# Patient Record
Sex: FEMALE | Race: WHITE | URBAN - METROPOLITAN AREA
[De-identification: names, ages, dates, MRNs, and addresses within clinical notes are randomized per-mention and may not be internally consistent; named-entity substitution may affect disease eponyms.]

---

## 2017-03-23 ENCOUNTER — OFFICE VISIT (OUTPATIENT)
Dept: URGENT CARE | Facility: RETAIL CLINIC | Age: 62
End: 2017-03-23
Payer: COMMERCIAL

## 2017-03-23 VITALS
SYSTOLIC BLOOD PRESSURE: 128 MMHG | OXYGEN SATURATION: 97 % | HEART RATE: 91 BPM | DIASTOLIC BLOOD PRESSURE: 81 MMHG | TEMPERATURE: 98.8 F

## 2017-03-23 DIAGNOSIS — J02.9 ACUTE PHARYNGITIS, UNSPECIFIED ETIOLOGY: Primary | ICD-10-CM

## 2017-03-23 LAB — S PYO AG THROAT QL IA.RAPID: NORMAL

## 2017-03-23 PROCEDURE — 87081 CULTURE SCREEN ONLY: CPT | Performed by: INTERNAL MEDICINE

## 2017-03-23 PROCEDURE — 87880 STREP A ASSAY W/OPTIC: CPT | Mod: QW | Performed by: INTERNAL MEDICINE

## 2017-03-23 PROCEDURE — 99212 OFFICE O/P EST SF 10 MIN: CPT | Performed by: INTERNAL MEDICINE

## 2017-03-23 NOTE — NURSING NOTE
Chief Complaint   Patient presents with     Fever     low grade; getting over cold, cough     Throat Problem     mild pain, drainage main complaint     Generalized Body Aches     Ent Problem     ears feel full       Initial /81 (BP Location: Left arm)  Pulse 91  Temp 98.8  F (37.1  C) (Oral)  SpO2 97% There is no height or weight on file to calculate BMI.  Medication Reconciliation: complete

## 2017-03-23 NOTE — PROGRESS NOTES
[facility] Care Progress Note        Kameron Wright MD, MPH  03/23/2017        History:      Kennedi Corea is a pleasant 61 year old year old female with a chief complaint of sore throat and nasal congestion x 2 days.   No fever or chills.   No dyspnea or chest pain.   No headache or neck pain.  No GI or  symptoms.   No MSK symptoms.         Assessment and Plan:        - RAPID STREP SCREEN  - BETA STREP GROUP A R/O CULTURE  URI:  Discussed supportive care with the patient:  Advised to increase fluid intake and rest.  Patient was advised to use throat lozenges and gargle with salt water for symptomatic relief.  Tylenol 650 mg po for pain q 6 hours prn  F/u w PCP in 1 week, earlier if symptoms worsen.                   Physical Exam:      /81 (BP Location: Left arm)  Pulse 91  Temp 98.8  F (37.1  C) (Oral)  SpO2 97%     Constitutional: Patient is in no distress The patient is pleasant and cooperative.   HEENT: Head:  Head is atraumatic, normocephalic.    Eyes: Pupils are equal, round and reactive to light and accomodation.  Sclera is non-icteric. No conjunctival injection, or exudate noted. Extraocular motion is intact. Visual acuity is intact bilaterally.  Ears:  External acoustic canals are patent and clear.  There is no erythema and bulging( exudate)  of the ( R/L ) tympanic membrane(s ).   Nose:   Nasal congestion w/o drainage or mucosal ulceration is noted.  Throat:  Oral mucosa is moist.  No oral lesions are noted.  Posterior pharyngeal hyperemia w/o exudate noted.     Neck Supple.  There is no cervical lymphadenopathy.  No nuchal rigidity noted.  There is no meningismus.     Cardiovascular: Heart is regular to rate and rhythm.  No murmur is noted.     Lungs: Clear in the anterior and posterior pulmonary fields.   Abdomen: Soft and non-tender.    Back No flank tenderness is noted.   Extremeties No edema, no calf tenderness.   Neuro: No focal deficit.   Skin No petechiae or purpura  is noted.  There is no rash.   Mood Normal              Data:      All new lab and imaging data was reviewed.   Results for orders placed or performed in visit on 03/23/17   RAPID STREP SCREEN   Result Value Ref Range    Rapid Strep A Screen neg neg

## 2017-03-23 NOTE — MR AVS SNAPSHOT
"              After Visit Summary   3/23/2017    Kennedi Corea    MRN: [id number]           Patient Information     Date Of Birth          1955        Visit Information        Provider Department      3/23/2017 9:00 AM Kameron Wright MD [facility]        Today's Diagnoses     Acute pharyngitis, unspecified etiology    -  1       Follow-ups after your visit        Who to contact     You can reach your care team any time of the day by calling [phone].  Notification of test results:  If you have an abnormal lab result, we will notify you by phone as soon as possible.         Additional Information About Your Visit        MyChart Information     RECESS. lets you send messages to your doctor, view your test results, renew your prescriptions, schedule appointments and more. To sign up, go to [email or web address] . Click on \"Log in\" on the left side of the screen, which will take you to the Welcome page. Then click on \"Sign up Now\" on the right side of the page.     You will be asked to enter the access code listed below, as well as some personal information. Please follow the directions to create your username and password.     Your access code is: [email or web address]  Expires: 2017 10:11 AM     Your access code will  in 90 days. If you need help or a new code, please call your [place] clinic or [phone].        Care EveryWhere ID     This is your [facility] EveryWhere ID. This could be used by other organizations to access your [facility] medical records  [id number]        Your Vitals Were     Pulse Temperature Pulse Oximetry             91 98.8  F (37.1  C) (Oral) 97%          Blood Pressure from Last 3 Encounters:   17 128/81   10/05/15 149/84    Weight from Last 3 Encounters:   No data found for Wt              We Performed the Following     BETA STREP GROUP A R/O CULTURE     RAPID STREP SCREEN        Primary Care Provider Office Phone # Fax #    Sarah Hart MD " [phone]       [address] [address]        Thank you!     Thank you for choosing [facility]  for your care. Our goal is always to provide you with excellent care. Hearing back from our patients is one way we can continue to improve our services. Please take a few minutes to complete the written survey that you may receive in the mail after your visit with us. Thank you!             Your Updated Medication List - Protect others around you: Learn how to safely use, store and throw away your medicines at www.disposemymeds.org.          This list is accurate as of: 3/23/17 10:11 AM.  Always use your most recent med list.                   Brand Name Dispense Instructions for use    SIMVASTATIN PO

## 2017-03-25 LAB — BETA STREP CONFIRM: NORMAL

## 2017-10-01 ENCOUNTER — HEALTH MAINTENANCE LETTER (OUTPATIENT)
Age: 62
End: 2017-10-01